# Patient Record
(demographics unavailable — no encounter records)

---

## 2024-10-14 NOTE — ASSESSMENT
[FreeTextEntry1] : R thumb CMC injection was performed to treat pain and inflammation Anesthesia: ethyl chloride sprayed topically Celestone 6mg: An injection of Celestone 1cc Lidocaine: An injection of Lidocaine 1% 1cc Marcaine: An injection of Marcaine 0.5% 1cc     The risks, benefits, and alternatives to cortisone injection were explained in full to the patient. Risks outlined include but are not limited to infection, sepsis, bleeding, scarring, skin discoloration, temporary increase in pain, syncopal episode, failure to resolve symptoms, allergic reaction, symptom recurrence, and elevation of blood sugar in diabetics. Patient understood the risks. All questions were answered. After discussion of options, patient verbally consented to an injection. Sterile prep was done of the injection site. Patient tolerated the procedure well. Advised to ice the injection site this evening.    Activity modification as tolerated Ice as needed NSAIDs as needed Return prn

## 2024-10-14 NOTE — PHYSICAL EXAM
[de-identified] : R hand: +thumb CMC swelling +thumb CMC tenderness Decreased thumb ROM +Basal grind test

## 2024-10-14 NOTE — HISTORY OF PRESENT ILLNESS
[Dull/Aching] : dull/aching [de-identified] : R hand has pain and swelling Thumb CMC [4] : 4 [FreeTextEntry1] : R wrist/ thumb/ hand

## 2024-12-02 NOTE — HISTORY OF PRESENT ILLNESS
[4] : 4 [Dull/Aching] : dull/aching [de-identified] : R thumb CMC injection 6 weeks ago Injection helped for 4 weeks  [2] : 2 [FreeTextEntry1] : R wrist/ thumb/ hand  [FreeTextEntry5] : PT states the pain hasn't change.

## 2024-12-02 NOTE — PHYSICAL EXAM
[de-identified] : R hand: +thumb CMC swelling +thumb CMC tenderness Decreased thumb ROM +Basal grind test

## 2024-12-02 NOTE — ASSESSMENT
[FreeTextEntry1] : She is having foot surgery this week She will be nonweight bearing for a while and needs some releif   R thumb CMC injection was performed to treat pain and inflammation Anesthesia: ethyl chloride sprayed topically Celestone 6mg: An injection of Celestone 1cc Lidocaine: An injection of Lidocaine 1% 1cc Marcaine: An injection of Marcaine 0.5% 1cc     The risks, benefits, and alternatives to cortisone injection were explained in full to the patient. Risks outlined include but are not limited to infection, sepsis, bleeding, scarring, skin discoloration, temporary increase in pain, syncopal episode, failure to resolve symptoms, allergic reaction, symptom recurrence, and elevation of blood sugar in diabetics. Patient understood the risks. All questions were answered. After discussion of options, patient verbally consented to an injection. Sterile prep was done of the injection site. Patient tolerated the procedure well. Advised to ice the injection site this evening.    Activity modification as tolerated Ice as needed NSAIDs as needed Return prn

## 2025-01-06 NOTE — CONSULT LETTER
[Dear  ___] : Dear  [unfilled], [Consult Letter:] : I had the pleasure of evaluating your patient, [unfilled]. [Please see my note below.] : Please see my note below. [Sincerely,] : Sincerely, [FreeTextEntry3] : Dr. Keenan Mcarthur

## 2025-01-06 NOTE — PHYSICAL EXAM
[de-identified] : General: Well appearing, no acute distress Neurologic: A&Ox3, No focal deficits Head: NCAT without abrasions, lacerations, or ecchymosis to head, face, or scalp Eyes: No scleral icterus, no gross abnormalities Respiratory: Equal chest wall expansion bilaterally, no accessory muscle use Lymphatic: No lymphadenopathy palpated Skin: Warm and dry Psychiatric: Normal mood and affect  Left sided CP. Upper extremity with some decreased function.   Examination of the Left hip reveals no obvious deformity or leg length discrepancy. There is no swelling noted. The patient is nontender to palpation over the greater trochanter, groin, and IT band. The patients range of motion is to 110 degrees of hip flexion, 40 degrees of abduction, 40 degrees of abduction, 20 degrees of adduction, 15 degrees of internal rotation and 35 degrees of external rotation. The patient has 5/5 strength to resisted hip flexion, abduction and adduction. The patient has a negative PARVEZ and positive FADIR Test. Positive Francois Test. Positive resisted SLR test at 30 degrees of hip flexion ([facility]). Negative Piriformis Test. No SI joint instability. There is no pain with logrolling. The calf and thigh are soft and nontender bilaterally. The patient is grossly neurovascularly intact distally. [de-identified] : The following radiographs were ordered and read by me during this patient's visit. I reviewed each radiograph in detail with the patient and discussed the findings as highlighted below. 3 views of the pelvis, 2 views of the left hip were obtained today that show no fracture, dislocation. Hip impingement present. There is no degenerative change seen. There is no malalignment. No obvious osseous abnormality. Otherwise unremarkable.

## 2025-01-06 NOTE — DISCUSSION/SUMMARY
[de-identified] : We had a thorough discussion regarding the nature of her pain, the pathophysiology, as well as all treatment options. I discussed operative and non-operative treatment modalities. Discussed the option of further imaging versus injections in the future. Recommend patient heal from her current left lower extremity injury for now. All questions were answered and the patient verbalized understanding. The patient is in agreement with this treatment plan. Patient will follow up in 6-8 wks for repeat clinical assessment.

## 2025-01-06 NOTE — HISTORY OF PRESENT ILLNESS
[de-identified] : NICO is a 24 year female here today for L hip. States that her hip pain has progressively worsened over last couple years. States the pain is localized to her groin. History of CP. She has had a few CT scans in the past. She is currently recovering from a left foot injury and is in a tall walking boot at this time.  at [employer].

## 2025-01-06 NOTE — ADDENDUM
[FreeTextEntry1] : Documented by Melissa Ricks acting as a scribe for Dr. Mcarthur and Dexter Burnett PA-C on 01/06/2025. All medical record entries made by the Scribe were at my, Dr. Mcarthur, and Dexter Burnett's, direction and personally dictated by me on 01/06/2025. I have reviewed the chart and agree that the record accurately reflects my personal performance of the history, physical exam, procedure and imaging.

## 2025-01-06 NOTE — DISCUSSION/SUMMARY
[de-identified] : We had a thorough discussion regarding the nature of her pain, the pathophysiology, as well as all treatment options. I discussed operative and non-operative treatment modalities. Discussed the option of further imaging versus injections in the future. Recommend patient heal from her current left lower extremity injury for now. All questions were answered and the patient verbalized understanding. The patient is in agreement with this treatment plan. Patient will follow up in 6-8 wks for repeat clinical assessment.

## 2025-01-06 NOTE — HISTORY OF PRESENT ILLNESS
[de-identified] : NICO is a 24 year female here today for L hip. States that her hip pain has progressively worsened over last couple years. States the pain is localized to her groin. History of CP. She has had a few CT scans in the past. She is currently recovering from a left foot injury and is in a tall walking boot at this time.  at [employer].

## 2025-02-10 NOTE — PHYSICAL EXAM
[de-identified] : General: Well appearing, no acute distress Neurologic: A&Ox3, No focal deficits Head: NCAT without abrasions, lacerations, or ecchymosis to head, face, or scalp Eyes: No scleral icterus, no gross abnormalities Respiratory: Equal chest wall expansion bilaterally, no accessory muscle use Lymphatic: No lymphadenopathy palpated Skin: Warm and dry Psychiatric: Normal mood and affect  Left sided CP. Upper extremity with some decreased function.   Examination of the Left hip reveals no obvious deformity or leg length discrepancy. There is no swelling noted. The patient is nontender to palpation over the greater trochanter, groin, and IT band. The patients range of motion is to 110 degrees of hip flexion, 40 degrees of abduction, 40 degrees of abduction, 20 degrees of adduction, 15 degrees of internal rotation and 35 degrees of external rotation. The patient has 5/5 strength to resisted hip flexion, abduction and adduction. The patient has a negative PARVEZ and positive FADIR Test. Positive Francois Test. Positive resisted SLR test at 30 degrees of hip flexion ([facility]). Negative Piriformis Test. No SI joint instability. There is no pain with logrolling. The calf and thigh are soft and nontender bilaterally. The patient is grossly neurovascularly intact distally. [de-identified] : No new imaging.

## 2025-02-10 NOTE — ADDENDUM
[FreeTextEntry1] : Documented by Melissa Ricks acting as a scribe for Dr. Mcarthur and Dexter Burnett PA-C on 02/10/2025. All medical record entries made by the Scribe were at my, Dr. Mcarthur, and Dexter Burnett's, direction and personally dictated by me on 02/10/2025. I have reviewed the chart and agree that the record accurately reflects my personal performance of the history, physical exam, procedure and imaging.

## 2025-02-10 NOTE — HISTORY OF PRESENT ILLNESS
[de-identified] : NICO is a 24 year female here today for L hip. States that her hip pain has progressively worsened over last couple years. States the pain is localized to her groin. History of CP. She has had a few CT scans in the past. She states that she recently followed with a non-operative physician at [facility] who advised her to have a corticosteroid hip injection.  at [employer]. She is unable to get MRIs due to the  shunt she has.

## 2025-02-10 NOTE — DISCUSSION/SUMMARY
[de-identified] : We had a thorough discussion regarding the nature of her pain, the pathophysiology, as well as all treatment options. I discussed operative and non-operative treatment modalities. Discussed the option of further imaging versus injections. Considering the patient's current presentation of pain, physical exam, and radiographs a CT arthrogram is indicated at this time. A prescription for this was given to the patient. We will go over the CT arthrogram results with her upon obtaining the results in the office and advise her of further treatment options. All questions were answered and the patient verbalized understanding. The patient is in agreement with this treatment plan.

## 2025-04-29 NOTE — PHYSICAL EXAM
[de-identified] : General: Well appearing, no acute distress Neurologic: A&Ox3, No focal deficits Head: NCAT without abrasions, lacerations, or ecchymosis to head, face, or scalp Eyes: No scleral icterus, no gross abnormalities Respiratory: Equal chest wall expansion bilaterally, no accessory muscle use Lymphatic: No lymphadenopathy palpated Skin: Warm and dry Psychiatric: Normal mood and affect  Left sided CP. Upper extremity with some decreased function.   Examination of the Left hip reveals no obvious deformity or leg length discrepancy. There is no swelling noted. The patient is nontender to palpation over the greater trochanter, groin, and IT band. The patients range of motion is to 110 degrees of hip flexion, 40 degrees of abduction, 40 degrees of abduction, 20 degrees of adduction, 15 degrees of internal rotation and 35 degrees of external rotation. The patient has 5/5 strength to resisted hip flexion, abduction and adduction. The patient has a negative PARVEZ and positive FADIR Test. Positive Francois Test. Positive resisted SLR test at 30 degrees of hip flexion ([facility]). Negative Piriformis Test. No SI joint instability. There is no pain with logrolling. The calf and thigh are soft and nontender bilaterally. The patient is grossly neurovascularly intact distally. [de-identified] : CT-Arthrogram L Hip [facility] DOS: 03/28/2025  Impression: No acutely displaced labrum tear or full-thickness cartilage defect.

## 2025-04-29 NOTE — PHYSICAL EXAM
[de-identified] : General: Well appearing, no acute distress Neurologic: A&Ox3, No focal deficits Head: NCAT without abrasions, lacerations, or ecchymosis to head, face, or scalp Eyes: No scleral icterus, no gross abnormalities Respiratory: Equal chest wall expansion bilaterally, no accessory muscle use Lymphatic: No lymphadenopathy palpated Skin: Warm and dry Psychiatric: Normal mood and affect  Left sided CP. Upper extremity with some decreased function.   Examination of the Left hip reveals no obvious deformity or leg length discrepancy. There is no swelling noted. The patient is nontender to palpation over the greater trochanter, groin, and IT band. The patients range of motion is to 110 degrees of hip flexion, 40 degrees of abduction, 40 degrees of abduction, 20 degrees of adduction, 15 degrees of internal rotation and 35 degrees of external rotation. The patient has 5/5 strength to resisted hip flexion, abduction and adduction. The patient has a negative PARVEZ and positive FADIR Test. Positive Francois Test. Positive resisted SLR test at 30 degrees of hip flexion ([facility]). Negative Piriformis Test. No SI joint instability. There is no pain with logrolling. The calf and thigh are soft and nontender bilaterally. The patient is grossly neurovascularly intact distally. [de-identified] : CT-Arthrogram L Hip [facility] DOS: 03/28/2025  Impression: No acutely displaced labrum tear or full-thickness cartilage defect.

## 2025-04-29 NOTE — HISTORY OF PRESENT ILLNESS
[de-identified] : NICO is a 24 year female here today for follow up due to L hip. States that her hip pain has progressively worsened over last couple years. States the pain is localized to her groin and radiates down the anterior portion of her thigh. History of CP. She has had a few CT scans in the past. She states that she recently followed with a non-operative physician at [facility] who advised her to have a corticosteroid hip injection.  at [employer]. She is unable to get MRIs due to the  shunt she has. Patient states she has been performing physical therapy. Had L hip intraarticular injection on 02/21/2025 and reports that this provided symptomatic relief until the beginning of April 2025. Presents today with CT Arthrogram of L hip for review from [facility].

## 2025-04-29 NOTE — DISCUSSION/SUMMARY
[de-identified] : We had a thorough discussion regarding the nature of her pain, the pathophysiology, as well as all treatment options. I discussed operative and non-operative treatment modalities. Patient has performed conservative measures of treatment including PT, and an intraarticular hip injection which provided her about 6 weeks of relief. Patient is not a candidate for a hip arthroscopy in my eyes at this time. I do recommend she continue with PT. Patient was given prescription of formal physical therapy that she will perform 2x/wk for 6-8 wks. Given patient's work at [employer] I recommend she not work consecutive days. All questions were answered and the patient verbalized understanding. The patient is in agreement with this treatment plan. Patient will follow up for repeat clinical assessment.

## 2025-04-29 NOTE — HISTORY OF PRESENT ILLNESS
[de-identified] : NICO is a 24 year female here today for follow up due to L hip. States that her hip pain has progressively worsened over last couple years. States the pain is localized to her groin and radiates down the anterior portion of her thigh. History of CP. She has had a few CT scans in the past. She states that she recently followed with a non-operative physician at [facility] who advised her to have a corticosteroid hip injection.  at [employer]. She is unable to get MRIs due to the  shunt she has. Patient states she has been performing physical therapy. Had L hip intraarticular injection on 02/21/2025 and reports that this provided symptomatic relief until the beginning of April 2025. Presents today with CT Arthrogram of L hip for review from [facility].

## 2025-04-29 NOTE — ADDENDUM
[FreeTextEntry1] : Documented by Melissa Ricks acting as a scribe for Dr. Mcarthur on 04/29/2025. All medical record entries made by the Scribe were at my, Dr. Mcarthur's, direction and personally dictated by me on 04/29/2025. I have reviewed the chart and agree that the record accurately reflects my personal performance of the history, physical exam, procedure and imaging.

## 2025-04-29 NOTE — DISCUSSION/SUMMARY
[de-identified] : We had a thorough discussion regarding the nature of her pain, the pathophysiology, as well as all treatment options. I discussed operative and non-operative treatment modalities. Patient has performed conservative measures of treatment including PT, and an intraarticular hip injection which provided her about 6 weeks of relief. Patient is not a candidate for a hip arthroscopy in my eyes at this time. I do recommend she continue with PT. Patient was given prescription of formal physical therapy that she will perform 2x/wk for 6-8 wks. Given patient's work at [employer] I recommend she not work consecutive days. All questions were answered and the patient verbalized understanding. The patient is in agreement with this treatment plan. Patient will follow up for repeat clinical assessment.

## 2025-06-13 NOTE — HISTORY OF PRESENT ILLNESS
[Dull/Aching] : dull/aching [de-identified] : R hand/thumb pain and swelling   R thumb CMC injection in Dec helped for 6 weeks [5] : 5 [3] : 3 [FreeTextEntry1] : R hand

## 2025-06-13 NOTE — REASON FOR VISIT
[FreeTextEntry2] :  06/13/2025:  24 year old female here today for: follow up R hand pain, De Quervain's disease (radial styloid tenosynovitis) and Primary osteoarthritis of first carpometacarpal joint

## 2025-06-13 NOTE — PHYSICAL EXAM
[de-identified] : R hand: +thumb CMC swelling +thumb CMC tenderness Decreased thumb ROM +Basal grind test

## 2025-06-13 NOTE — ASSESSMENT
[FreeTextEntry1] : Thumb CMC  arthritis is a progressive problem that once occurs will be a chronic issue that will likely continue until surgical treatment is necessary. Treatment options for arthritis include OTC NSAIDS, prescription NSAIDS, ice, bracing, OT, cortisone injection, and surgery. Surgical options include arthroplasty and fusion of the arthritic joint.   I recommend the patient take over the counter medication such as Advil/Motrin/Aleve or Tylenol for pain and inflammation  R thumb thumb dorsal CMC small joint injection was performed to treat pain and inflammation under aseptic conditions with betadine and alcohol Anesthesia: ethyl chloride sprayed topically Celestone 6mg/1cc: An injection of Celestone 1cc Lidocaine: An injection of Lidocaine 1% 1cc Marcaine: An injection of Marcaine 0.5% 1cc 25G needle     The risks, benefits, and alternatives to cortisone injection were explained in full to the patient. Risks outlined include but are not limited to infection, sepsis, bleeding, scarring, skindiscoloration, temporary increase in pain, syncopal episode, failure to resolve symptoms, allergic reaction, symptom recurrence, and elevation of blood sugar in diabetics. Patient understood the risks. All questions were answered. After discussion of options, patient verbally consented to an injection. Sterile prep was done of the injection site. Patient tolerated the procedure well. Advised to ice the injection site this evening.   Return prn